# Patient Record
Sex: FEMALE | Race: WHITE
[De-identification: names, ages, dates, MRNs, and addresses within clinical notes are randomized per-mention and may not be internally consistent; named-entity substitution may affect disease eponyms.]

---

## 2017-10-24 ENCOUNTER — HOSPITAL ENCOUNTER (OUTPATIENT)
Dept: HOSPITAL 61 - KCIC MAMMO | Age: 57
Discharge: HOME | End: 2017-10-24
Attending: INTERNAL MEDICINE
Payer: COMMERCIAL

## 2017-10-24 DIAGNOSIS — Z12.31: Primary | ICD-10-CM

## 2017-10-24 PROCEDURE — 77063 BREAST TOMOSYNTHESIS BI: CPT

## 2017-10-24 NOTE — [facility]
DATE: 10/24/2017



EXAM: MAMMO MITCHELL SCREENING BILATERAL



HISTORY: Occasional tenderness.



COMPARISON: Prior mammogram from 1/21/2016, 10/30/2013, 10/31/2011



This study was interpreted with the benefit of Computerized Aided Detection

(CAD).





The breast parenchyma is heterogeneously dense, which could reduce sensitivity

of mammography. Breast parenchyma level C.





FINDINGS: Bilateral CC and MLO views of the breasts were performed. Bilateral

breast tomosynthesis was performed in CC and MLO projections.



Right breast: There are no suspicious microcalcifications, masses or areas of

architectural distortion.



Left breast: There are no suspicious microcalcifications, masses or areas of

architectural distortion.



Findings are stable from prior mammogram.  





IMPRESSION: Negative bilateral 2D and 3-D mammogram. Recommend clinical

management for occasional tenderness. Otherwise, annual screening mammography

is recommended. Please note, negative mammogram does not preclude further

imaging if symptoms or clinical evaluation warrants.







BI-RADS CATEGORY: 1 NEGATIVE



RECOMMENDED FOLLOW-UP: 12M 12 MONTH FOLLOW-UP



PQRS compliance statement: Patient information was entered into a reminder

system with a target due date 10/24/2018 for the next mammogram.



Mammography is a sensitive method for finding small breast cancers, but it

does not detect them all and is not a substitute for careful clinical

examination.  A negative mammogram does not negate a clinically suspicious

finding and should not result in delay in biopsying a clinically suspicious

abnormality.



"Our facility is accredited by the [employer] Mammography

Program."

## 2019-01-28 ENCOUNTER — HOSPITAL ENCOUNTER (OUTPATIENT)
Dept: HOSPITAL 61 - KCIC MAMMO | Age: 59
Discharge: HOME | End: 2019-01-28
Attending: INTERNAL MEDICINE
Payer: COMMERCIAL

## 2019-01-28 DIAGNOSIS — Z12.31: Primary | ICD-10-CM

## 2019-01-28 PROCEDURE — 77067 SCR MAMMO BI INCL CAD: CPT

## 2019-01-28 PROCEDURE — 77063 BREAST TOMOSYNTHESIS BI: CPT

## 2019-01-28 NOTE — [facility]
Bilateral digital screening mammograms with 3-D tomosynthesis:

 

Reason for examination: Routine screening.

 

Comparison is made to previous studies dated 10/24/2017, 1/21/2016 and 

10/30/2013.

 

Bilateral mammograms in CC and oblique projections were obtained with 2-D 

imaging and 3-D tomosynthesis imaging on a Siemens Inspiration unit and 

reviewed on the workstation. Interpretation was made with the benefit of 

CAD.

 

The skin and nipples show no abnormalities. No abnormal axillary lymph 

nodes are seen. The breast parenchyma is heterogeneously dense. (Breast 

density: Category C.) There is some generalized nodularity to the 

parenchyma. There are however appears to be some new nodularity suggested 

at the 9:00 B position of the right breast. Further evaluation with 

ultrasound is recommended. There are no suspicious calcifications.

 

Impression:

 

Dense nodular parenchyma bilaterally with some new nodularity suggested at

the 9:00 B position of the right breast. Recommend further evaluation with

ultrasound.

 

Your patient's mammogram demonstrates that she has dense breast tissue 

(breast density category C or D), which could hide abnormalities, and if 

she has other risk factors for breast cancer that have been identified, 

she might benefit from supplemental screening tests that may be suggested 

by you as her ordering physician. Dense breast tissue, in and of itself, 

is a relatively common condition. Therefore, this information is not 

provided to cause undue concern, but rather to raise your awareness and to

promote discussion with your patient regarding the presence of other risk 

factors, in addition to dense breast tissue. Your patient's mammography 

results will be sent to her.

 

BI-RAD Category: Incomplete. Needs additional imaging evaluation.

 

"Our facility is accredited by the [employer] 

Mammography Program."

 

This patient's information has been entered into a reminder system for the

patient to be notified with the results of her examination and a target 

date for the next mammogram.

 

Electronically signed by: Rosamaria Wiseman MD (1/28/2019 5:16 PM) [id number]

## 2019-01-31 ENCOUNTER — HOSPITAL ENCOUNTER (OUTPATIENT)
Dept: HOSPITAL 61 - US | Age: 59
Discharge: HOME | End: 2019-01-31
Attending: INTERNAL MEDICINE
Payer: COMMERCIAL

## 2019-01-31 DIAGNOSIS — R92.8: Primary | ICD-10-CM

## 2019-01-31 PROCEDURE — 76641 ULTRASOUND BREAST COMPLETE: CPT

## 2019-02-11 NOTE — RAD
Limited right breast ultrasound 1/31/2019

 

CLINICAL INDICATION: New nodularity at the 9:00 position of the right 

breast.

 

COMPARISON: Mammogram 1/28/2019

 

FINDINGS:

 

At the 9:00 position, there are scattered normal fibroglandular tissue 

with a subcentimeter cyst most consistent with fibrocystic change.

 

IMPRESSION: No suspicious mass at the 9:00 position right breast.

 

Assessment BI-RADS Category 2, benign findings.

 

Recommendation: Return to screening mammogram with a target date of 

January 2020

 

Electronically signed by: Ian Shaffer MD (2/11/2019 12:13 PM) [facility]

## 2020-11-04 ENCOUNTER — HOSPITAL ENCOUNTER (OUTPATIENT)
Dept: HOSPITAL 61 - KCIC MAMMO | Age: 60
End: 2020-11-04
Attending: INTERNAL MEDICINE
Payer: COMMERCIAL

## 2020-11-04 DIAGNOSIS — Z12.31: Primary | ICD-10-CM

## 2020-11-04 PROCEDURE — 77063 BREAST TOMOSYNTHESIS BI: CPT

## 2020-11-04 PROCEDURE — 77067 SCR MAMMO BI INCL CAD: CPT

## 2020-11-04 NOTE — [facility]
Bilateral digital screening mammograms with 3-D tomosynthesis:

 

Reason for examination: Routine screening.

 

Comparison is made to previous studies dated back to 1/21/2016.

 

Bilateral mammograms in CC and oblique projections were obtained with 2-D 

imaging and 3-D tomosynthesis imaging on a Siemens Inspiration unit and 

reviewed on the workstation. Interpretation was made with the benefit of 

CAD.

 

The skin and nipples show no abnormalities. No abnormal axillary lymph 

nodes are seen. The breast parenchyma is extremely dense. (Breast density:

Category D.) There are no dominant masses, suspicious calcifications or 

architectural distortion.

 

Impression:

 

No evidence of malignancy. Recommend routine screening.

 

Your patient's mammogram demonstrates that she has dense breast tissue 

(breast density category C or D), which could hide abnormalities, and if 

she has other risk factors for breast cancer that have been identified, 

she might benefit from supplemental screening tests that may be suggested 

by you as her ordering physician. Dense breast tissue, in and of itself, 

is a relatively common condition. Therefore, this information is not 

provided to cause undue concern, but rather to raise your awareness and to

promote discussion with your patient regarding the presence of other risk 

factors, in addition to dense breast tissue. Your patient's mammography 

results will be sent to her.

 

BI-RAD Category 1: Negative.

 

"Our facility is accredited by the [employer] 

Mammography Program."

 

This patient's information has been entered into a reminder system for the

patient to be notified with the results of her examination and a target 

date for the next mammogram.

 

Electronically signed by: Rosamaria Wiseman MD (11/4/2020 8:00 PM) [email or web address]

## 2021-10-05 ENCOUNTER — HOSPITAL ENCOUNTER (OUTPATIENT)
Dept: HOSPITAL 61 - LAB | Age: 61
End: 2021-10-05
Attending: INTERNAL MEDICINE
Payer: COMMERCIAL

## 2021-10-05 DIAGNOSIS — R09.89: ICD-10-CM

## 2021-10-05 DIAGNOSIS — R51.9: ICD-10-CM

## 2021-10-05 DIAGNOSIS — Z20.822: Primary | ICD-10-CM

## 2021-10-05 PROCEDURE — U0003 INFECTIOUS AGENT DETECTION BY NUCLEIC ACID (DNA OR RNA); SEVERE ACUTE RESPIRATORY SYNDROME CORONAVIRUS 2 (SARS-COV-2) (CORONAVIRUS DISEASE [COVID-19]), AMPLIFIED PROBE TECHNIQUE, MAKING USE OF HIGH THROUGHPUT TECHNOLOGIES AS DESCRIBED BY CMS-2020-01-R: HCPCS

## 2021-10-11 ENCOUNTER — HOSPITAL ENCOUNTER (OUTPATIENT)
Dept: HOSPITAL 61 - LAB | Age: 61
End: 2021-10-11
Attending: INTERNAL MEDICINE
Payer: COMMERCIAL

## 2021-10-11 DIAGNOSIS — U07.1: Primary | ICD-10-CM

## 2021-10-11 PROCEDURE — U0003 INFECTIOUS AGENT DETECTION BY NUCLEIC ACID (DNA OR RNA); SEVERE ACUTE RESPIRATORY SYNDROME CORONAVIRUS 2 (SARS-COV-2) (CORONAVIRUS DISEASE [COVID-19]), AMPLIFIED PROBE TECHNIQUE, MAKING USE OF HIGH THROUGHPUT TECHNOLOGIES AS DESCRIBED BY CMS-2020-01-R: HCPCS

## 2022-05-20 ENCOUNTER — HOSPITAL ENCOUNTER (OUTPATIENT)
Dept: HOSPITAL 61 - LAB | Age: 62
End: 2022-05-20
Attending: INTERNAL MEDICINE
Payer: COMMERCIAL

## 2022-05-20 DIAGNOSIS — J02.9: ICD-10-CM

## 2022-05-20 DIAGNOSIS — U07.1: Primary | ICD-10-CM

## 2022-05-20 DIAGNOSIS — R09.81: ICD-10-CM

## 2022-05-20 DIAGNOSIS — R05.9: ICD-10-CM

## 2022-05-20 PROCEDURE — U0003 INFECTIOUS AGENT DETECTION BY NUCLEIC ACID (DNA OR RNA); SEVERE ACUTE RESPIRATORY SYNDROME CORONAVIRUS 2 (SARS-COV-2) (CORONAVIRUS DISEASE [COVID-19]), AMPLIFIED PROBE TECHNIQUE, MAKING USE OF HIGH THROUGHPUT TECHNOLOGIES AS DESCRIBED BY CMS-2020-01-R: HCPCS
